# Patient Record
Sex: MALE | URBAN - NONMETROPOLITAN AREA
[De-identification: names, ages, dates, MRNs, and addresses within clinical notes are randomized per-mention and may not be internally consistent; named-entity substitution may affect disease eponyms.]

---

## 2023-05-18 ENCOUNTER — APPOINTMENT (RX ONLY)
Dept: URBAN - NONMETROPOLITAN AREA CLINIC 25 | Facility: CLINIC | Age: 63
Setting detail: DERMATOLOGY
End: 2023-05-18

## 2023-05-18 DIAGNOSIS — L82.1 OTHER SEBORRHEIC KERATOSIS: ICD-10-CM

## 2023-05-18 DIAGNOSIS — L57.0 ACTINIC KERATOSIS: ICD-10-CM

## 2023-05-18 DIAGNOSIS — L81.4 OTHER MELANIN HYPERPIGMENTATION: ICD-10-CM

## 2023-05-18 PROCEDURE — 17000 DESTRUCT PREMALG LESION: CPT

## 2023-05-18 PROCEDURE — 99203 OFFICE O/P NEW LOW 30 MIN: CPT | Mod: 25

## 2023-05-18 PROCEDURE — ? TREATMENT REGIMEN

## 2023-05-18 PROCEDURE — 17003 DESTRUCT PREMALG LES 2-14: CPT

## 2023-05-18 PROCEDURE — ? LIQUID NITROGEN

## 2023-05-18 PROCEDURE — ? COUNSELING

## 2023-05-18 ASSESSMENT — LOCATION DETAILED DESCRIPTION DERM
LOCATION DETAILED: LEFT PROXIMAL POSTERIOR UPPER ARM
LOCATION DETAILED: RIGHT CENTRAL TEMPLE
LOCATION DETAILED: RIGHT ULNAR DORSAL HAND
LOCATION DETAILED: RIGHT DISTAL LATERAL POSTERIOR UPPER ARM
LOCATION DETAILED: LEFT CENTRAL MALAR CHEEK
LOCATION DETAILED: LEFT DISTAL DORSAL FOREARM
LOCATION DETAILED: RIGHT RADIAL DORSAL HAND
LOCATION DETAILED: LEFT DISTAL RADIAL DORSAL FOREARM
LOCATION DETAILED: LEFT SUPERIOR HELIX
LOCATION DETAILED: LEFT PROXIMAL DORSAL FOREARM
LOCATION DETAILED: RIGHT SUPERIOR CENTRAL MALAR CHEEK
LOCATION DETAILED: LEFT PROXIMAL ULNAR DORSAL FOREARM
LOCATION DETAILED: LEFT LATERAL TRAPEZIAL NECK
LOCATION DETAILED: LEFT PROXIMAL RADIAL DORSAL FOREARM
LOCATION DETAILED: LEFT PROXIMAL DORSAL MIDDLE FINGER
LOCATION DETAILED: LEFT CENTRAL ZYGOMA
LOCATION DETAILED: RIGHT DISTAL DORSAL FOREARM
LOCATION DETAILED: SUPERIOR THORACIC SPINE
LOCATION DETAILED: LEFT SUPERIOR CRUS OF ANTIHELIX
LOCATION DETAILED: RIGHT LATERAL BUCCAL CHEEK
LOCATION DETAILED: LEFT DISTAL POSTERIOR UPPER ARM
LOCATION DETAILED: RIGHT SUPERIOR CENTRAL BUCCAL CHEEK

## 2023-05-18 ASSESSMENT — LOCATION ZONE DERM
LOCATION ZONE: FINGER
LOCATION ZONE: NECK
LOCATION ZONE: ARM
LOCATION ZONE: EAR
LOCATION ZONE: TRUNK
LOCATION ZONE: FACE
LOCATION ZONE: HAND

## 2023-05-18 ASSESSMENT — LOCATION SIMPLE DESCRIPTION DERM
LOCATION SIMPLE: LEFT EAR
LOCATION SIMPLE: LEFT UPPER ARM
LOCATION SIMPLE: UPPER BACK
LOCATION SIMPLE: LEFT ZYGOMA
LOCATION SIMPLE: RIGHT HAND
LOCATION SIMPLE: LEFT FOREARM
LOCATION SIMPLE: RIGHT UPPER ARM
LOCATION SIMPLE: LEFT CHEEK
LOCATION SIMPLE: RIGHT TEMPLE
LOCATION SIMPLE: RIGHT FOREARM
LOCATION SIMPLE: POSTERIOR NECK
LOCATION SIMPLE: LEFT MIDDLE FINGER
LOCATION SIMPLE: RIGHT CHEEK

## 2023-05-18 ASSESSMENT — TOTAL NUMBER OF LESIONS: # OF LESIONS?: 19

## 2023-05-18 NOTE — PROCEDURE: MIPS QUALITY
Quality 130: Documentation Of Current Medications In The Medical Record: Current Medications Documented
Detail Level: Detailed
Quality 111:Pneumonia Vaccination Status For Older Adults: Patient did not receive any pneumococcal conjugate or polysaccharide vaccine on or after their 60th birthday and before the end of the measurement period
Quality 110: Preventive Care And Screening: Influenza Immunization: Influenza Immunization Administered during Influenza season

## 2023-05-18 NOTE — PROCEDURE: LIQUID NITROGEN
Number Of Freeze-Thaw Cycles: 2 freeze-thaw cycles
Render Post-Care Instructions In Note?: yes
Consent: The patient's consent was obtained including but not limited to risks of crusting, scabbing, blistering, scarring, darker or lighter pigmentary change, recurrence, incomplete removal and infection.
Render Note In Bullet Format When Appropriate: No
Duration Of Freeze Thaw-Cycle (Seconds): 12
Post-Care Instructions: 1. A blister may result in the majority of cases.  The blister may be filled with clear fluid or blood.  This is a normal response to therapy.\\n2. If the blister causes pain because of significant tension within the blister, you may puncture the blister with a sterile needle.  Do not unroof the blister.\\n3. If any significant discomfort arises, cold compresses may be applied.  Tylenol will help in most circumstances.\\n4. If the blister is broken by you or breaks on its own, soak the area in warm tap water twice a day, then apply Bacitracin Ointment or Vaseline.\\n5. Should there be marked swelling and pain and redness a the site of treatment, feel free to call our office.\\n6.  If the area still persists in 6 weeks, please phone for a follow-up appointment.
Detail Level: Detailed
Post-Care Instructions: 1. A blister may result in the majority of cases.  The blister may be filled with clear fluid or blood.  This is a normal response to therapy.\\n2. If the blister causes pain because of significant tension within the blister, you may puncture the blister with a sterile needle.  Do not unroof the blister.\\n3. If any significant discomfort arises, cold compresses may be applied.  Tylenol will help in most circumstances.\\n4. If the blister is broken by you or breaks on its own, soak the area in warm tap water twice a day, then apply Bacitracin Ointment or Vaseline.\\n5. Should there be marked swelling and pain and redness a the site of treatment, feel free to call our office.\\n6.  If the area still persists in 6 weeks, please phone for a follow-up appointment.

## 2023-05-18 NOTE — PROCEDURE: TREATMENT REGIMEN
Detail Level: Detailed
Plan: Advised patient if not resolved by next visit, we will biopsy.
Plan: Treated in office today with LN2.

## 2023-06-15 ENCOUNTER — APPOINTMENT (RX ONLY)
Dept: URBAN - NONMETROPOLITAN AREA CLINIC 25 | Facility: CLINIC | Age: 63
Setting detail: DERMATOLOGY
End: 2023-06-15

## 2023-06-15 VITALS — WEIGHT: 210 LBS | HEIGHT: 69 IN

## 2023-06-15 DIAGNOSIS — L57.0 ACTINIC KERATOSIS: ICD-10-CM

## 2023-06-15 DIAGNOSIS — L82.1 OTHER SEBORRHEIC KERATOSIS: ICD-10-CM

## 2023-06-15 PROCEDURE — 17003 DESTRUCT PREMALG LES 2-14: CPT

## 2023-06-15 PROCEDURE — 99212 OFFICE O/P EST SF 10 MIN: CPT | Mod: 25

## 2023-06-15 PROCEDURE — 17000 DESTRUCT PREMALG LESION: CPT

## 2023-06-15 PROCEDURE — ? COUNSELING

## 2023-06-15 PROCEDURE — ? LIQUID NITROGEN

## 2023-06-15 PROCEDURE — ? TREATMENT REGIMEN

## 2023-06-15 ASSESSMENT — LOCATION ZONE DERM
LOCATION ZONE: FACE
LOCATION ZONE: ARM
LOCATION ZONE: EAR
LOCATION ZONE: HAND

## 2023-06-15 ASSESSMENT — LOCATION SIMPLE DESCRIPTION DERM
LOCATION SIMPLE: RIGHT UPPER ARM
LOCATION SIMPLE: RIGHT FOREHEAD
LOCATION SIMPLE: RIGHT EAR
LOCATION SIMPLE: LEFT FOREARM
LOCATION SIMPLE: LEFT UPPER ARM
LOCATION SIMPLE: RIGHT FOREARM
LOCATION SIMPLE: LEFT CHEEK
LOCATION SIMPLE: RIGHT HAND
LOCATION SIMPLE: RIGHT CHEEK

## 2023-06-15 ASSESSMENT — LOCATION DETAILED DESCRIPTION DERM
LOCATION DETAILED: RIGHT INFERIOR CENTRAL MALAR CHEEK
LOCATION DETAILED: LEFT DISTAL DORSAL FOREARM
LOCATION DETAILED: RIGHT FOREHEAD
LOCATION DETAILED: RIGHT RADIAL DORSAL HAND
LOCATION DETAILED: LEFT PROXIMAL DORSAL FOREARM
LOCATION DETAILED: LEFT DISTAL POSTERIOR UPPER ARM
LOCATION DETAILED: RIGHT PROXIMAL RADIAL DORSAL FOREARM
LOCATION DETAILED: RIGHT SUPERIOR FOREHEAD
LOCATION DETAILED: RIGHT CENTRAL MALAR CHEEK
LOCATION DETAILED: RIGHT ANTERIOR DISTAL UPPER ARM
LOCATION DETAILED: RIGHT SUPERIOR HELIX
LOCATION DETAILED: RIGHT PROXIMAL DORSAL FOREARM
LOCATION DETAILED: LEFT SUPERIOR CENTRAL MALAR CHEEK

## 2023-06-15 NOTE — PROCEDURE: LIQUID NITROGEN
Render Note In Bullet Format When Appropriate: No
Post-Care Instructions: 1. A blister may result in the majority of cases.  The blister may be filled with clear fluid or blood.  This is a normal response to therapy.\\n2. If the blister causes pain because of significant tension within the blister, you may puncture the blister with a sterile needle.  Do not unroof the blister.\\n3. If any significant discomfort arises, cold compresses may be applied.  Tylenol will help in most circumstances.\\n4. If the blister is broken by you or breaks on its own, soak the area in warm tap water twice a day, then apply Bacitracin Ointment or Vaseline.\\n5. Should there be marked swelling and pain and redness a the site of treatment, feel free to call our office.\\n6.  If the area still persists in 6 weeks, please phone for a follow-up appointment.
Number Of Freeze-Thaw Cycles: 2 freeze-thaw cycles
Render Post-Care Instructions In Note?: yes
Consent: The patient's consent was obtained including but not limited to risks of crusting, scabbing, blistering, scarring, darker or lighter pigmentary change, recurrence, incomplete removal and infection.
Detail Level: Detailed
Duration Of Freeze Thaw-Cycle (Seconds): 12
Post-Care Instructions: 1. A blister may result in the majority of cases.  The blister may be filled with clear fluid or blood.  This is a normal response to therapy.\\n2. If the blister causes pain because of significant tension within the blister, you may puncture the blister with a sterile needle.  Do not unroof the blister.\\n3. If any significant discomfort arises, cold compresses may be applied.  Tylenol will help in most circumstances.\\n4. If the blister is broken by you or breaks on its own, soak the area in warm tap water twice a day, then apply Bacitracin Ointment or Vaseline.\\n5. Should there be marked swelling and pain and redness a the site of treatment, feel free to call our office.\\n6.  If the area still persists in 6 weeks, please phone for a follow-up appointment.

## 2023-10-19 ENCOUNTER — APPOINTMENT (RX ONLY)
Dept: URBAN - NONMETROPOLITAN AREA CLINIC 25 | Facility: CLINIC | Age: 63
Setting detail: DERMATOLOGY
End: 2023-10-19

## 2023-10-19 DIAGNOSIS — L57.0 ACTINIC KERATOSIS: ICD-10-CM

## 2023-10-19 DIAGNOSIS — L57.8 OTHER SKIN CHANGES DUE TO CHRONIC EXPOSURE TO NONIONIZING RADIATION: ICD-10-CM

## 2023-10-19 PROCEDURE — ? SUNSCREEN RECOMMENDATIONS

## 2023-10-19 PROCEDURE — ? LIQUID NITROGEN

## 2023-10-19 PROCEDURE — ? COUNSELING

## 2023-10-19 PROCEDURE — 17004 DESTROY PREMAL LESIONS 15/>: CPT

## 2023-10-19 PROCEDURE — 99213 OFFICE O/P EST LOW 20 MIN: CPT | Mod: 25

## 2023-10-19 ASSESSMENT — LOCATION SIMPLE DESCRIPTION DERM
LOCATION SIMPLE: NECK
LOCATION SIMPLE: LEFT UPPER ARM
LOCATION SIMPLE: RIGHT TEMPLE
LOCATION SIMPLE: RIGHT CHEEK
LOCATION SIMPLE: RIGHT UPPER ARM
LOCATION SIMPLE: RIGHT ZYGOMA
LOCATION SIMPLE: LEFT CHEEK
LOCATION SIMPLE: RIGHT FOREARM
LOCATION SIMPLE: LEFT FOREARM

## 2023-10-19 ASSESSMENT — LOCATION DETAILED DESCRIPTION DERM
LOCATION DETAILED: LEFT PROXIMAL ULNAR DORSAL FOREARM
LOCATION DETAILED: LEFT PROXIMAL DORSAL FOREARM
LOCATION DETAILED: RIGHT PROXIMAL DORSAL FOREARM
LOCATION DETAILED: LEFT INFERIOR CENTRAL MALAR CHEEK
LOCATION DETAILED: LEFT DISTAL POSTERIOR UPPER ARM
LOCATION DETAILED: RIGHT SUPERIOR MEDIAL MALAR CHEEK
LOCATION DETAILED: RIGHT SUPERIOR LATERAL NECK
LOCATION DETAILED: RIGHT MEDIAL TEMPLE
LOCATION DETAILED: RIGHT INFERIOR LATERAL MALAR CHEEK
LOCATION DETAILED: RIGHT MID PREAURICULAR CHEEK
LOCATION DETAILED: RIGHT DISTAL POSTERIOR UPPER ARM
LOCATION DETAILED: RIGHT SUPERIOR LATERAL BUCCAL CHEEK
LOCATION DETAILED: RIGHT MEDIAL ZYGOMA
LOCATION DETAILED: RIGHT PROXIMAL POSTERIOR UPPER ARM
LOCATION DETAILED: RIGHT DISTAL DORSAL FOREARM

## 2023-10-19 ASSESSMENT — LOCATION ZONE DERM
LOCATION ZONE: NECK
LOCATION ZONE: FACE
LOCATION ZONE: ARM

## 2023-10-19 NOTE — PROCEDURE: LIQUID NITROGEN
Consent: The patient's consent was obtained including but not limited to risks of crusting, scabbing, blistering, scarring, darker or lighter pigmentary change, recurrence, incomplete removal and infection.
Post-Care Instructions: 1. A blister may result in the majority of cases.  The blister may be filled with clear fluid or blood.  This is a normal response to therapy.\\n2. If the blister causes pain because of significant tension within the blister, you may puncture the blister with a sterile needle.  Do not unroof the blister.\\n3. If any significant discomfort arises, cold compresses may be applied.  Tylenol will help in most circumstances.\\n4. If the blister is broken by you or breaks on its own, soak the area in warm tap water twice a day, then apply Bacitracin Ointment or Vaseline.\\n5. Should there be marked swelling and pain and redness a the site of treatment, feel free to call our office.\\n6.  If the area still persists in 6 weeks, please phone for a follow-up appointment.
Show Applicator Variable?: Yes
Detail Level: Detailed
Number Of Freeze-Thaw Cycles: 2 freeze-thaw cycles
Duration Of Freeze Thaw-Cycle (Seconds): 12
Render Note In Bullet Format When Appropriate: No